# Patient Record
Sex: MALE | Race: WHITE | Employment: PART TIME | URBAN - METROPOLITAN AREA
[De-identification: names, ages, dates, MRNs, and addresses within clinical notes are randomized per-mention and may not be internally consistent; named-entity substitution may affect disease eponyms.]

---

## 2020-03-11 ENCOUNTER — OFFICE VISIT (OUTPATIENT)
Dept: PRIMARY CARE CLINIC | Age: 60
End: 2020-03-11
Payer: COMMERCIAL

## 2020-03-11 VITALS
WEIGHT: 180.4 LBS | HEIGHT: 69 IN | SYSTOLIC BLOOD PRESSURE: 136 MMHG | HEART RATE: 59 BPM | OXYGEN SATURATION: 99 % | BODY MASS INDEX: 26.72 KG/M2 | DIASTOLIC BLOOD PRESSURE: 78 MMHG

## 2020-03-11 DIAGNOSIS — Z13.1 SCREENING FOR DIABETES MELLITUS: ICD-10-CM

## 2020-03-11 DIAGNOSIS — Z00.00 ROUTINE PHYSICAL EXAMINATION: ICD-10-CM

## 2020-03-11 DIAGNOSIS — Z12.5 PROSTATE CANCER SCREENING: ICD-10-CM

## 2020-03-11 DIAGNOSIS — E55.9 VITAMIN D DEFICIENCY: ICD-10-CM

## 2020-03-11 DIAGNOSIS — Z13.220 LIPID SCREENING: ICD-10-CM

## 2020-03-11 LAB
A/G RATIO: 1.5 (ref 1.1–2.2)
ALBUMIN SERPL-MCNC: 4.6 G/DL (ref 3.4–5)
ALP BLD-CCNC: 57 U/L (ref 40–129)
ALT SERPL-CCNC: 21 U/L (ref 10–40)
ANION GAP SERPL CALCULATED.3IONS-SCNC: 9 MMOL/L (ref 3–16)
AST SERPL-CCNC: 15 U/L (ref 15–37)
BASOPHILS ABSOLUTE: 0.1 K/UL (ref 0–0.2)
BASOPHILS RELATIVE PERCENT: 0.7 %
BILIRUB SERPL-MCNC: 1.1 MG/DL (ref 0–1)
BUN BLDV-MCNC: 12 MG/DL (ref 7–20)
CALCIUM SERPL-MCNC: 10 MG/DL (ref 8.3–10.6)
CHLORIDE BLD-SCNC: 100 MMOL/L (ref 99–110)
CHOLESTEROL, TOTAL: 152 MG/DL (ref 0–199)
CO2: 27 MMOL/L (ref 21–32)
CREAT SERPL-MCNC: 1 MG/DL (ref 0.9–1.3)
EOSINOPHILS ABSOLUTE: 0.2 K/UL (ref 0–0.6)
EOSINOPHILS RELATIVE PERCENT: 1.5 %
GFR AFRICAN AMERICAN: >60
GFR NON-AFRICAN AMERICAN: >60
GLOBULIN: 3 G/DL
GLUCOSE BLD-MCNC: 109 MG/DL (ref 70–99)
HCT VFR BLD CALC: 48.2 % (ref 40.5–52.5)
HDLC SERPL-MCNC: 39 MG/DL (ref 40–60)
HEMOGLOBIN: 16 G/DL (ref 13.5–17.5)
LDL CHOLESTEROL CALCULATED: 97 MG/DL
LYMPHOCYTES ABSOLUTE: 3 K/UL (ref 1–5.1)
LYMPHOCYTES RELATIVE PERCENT: 24.8 %
MCH RBC QN AUTO: 29.7 PG (ref 26–34)
MCHC RBC AUTO-ENTMCNC: 33.2 G/DL (ref 31–36)
MCV RBC AUTO: 89.4 FL (ref 80–100)
MONOCYTES ABSOLUTE: 0.7 K/UL (ref 0–1.3)
MONOCYTES RELATIVE PERCENT: 6 %
NEUTROPHILS ABSOLUTE: 8 K/UL (ref 1.7–7.7)
NEUTROPHILS RELATIVE PERCENT: 67 %
PDW BLD-RTO: 14.5 % (ref 12.4–15.4)
PLATELET # BLD: 336 K/UL (ref 135–450)
PMV BLD AUTO: 9 FL (ref 5–10.5)
POTASSIUM SERPL-SCNC: 4.9 MMOL/L (ref 3.5–5.1)
PROSTATE SPECIFIC ANTIGEN: 1.21 NG/ML (ref 0–4)
RBC # BLD: 5.39 M/UL (ref 4.2–5.9)
SODIUM BLD-SCNC: 136 MMOL/L (ref 136–145)
TOTAL PROTEIN: 7.6 G/DL (ref 6.4–8.2)
TRIGL SERPL-MCNC: 80 MG/DL (ref 0–150)
TSH SERPL DL<=0.05 MIU/L-ACNC: 1.43 UIU/ML (ref 0.27–4.2)
VITAMIN D 25-HYDROXY: 32.4 NG/ML
VLDLC SERPL CALC-MCNC: 16 MG/DL
WBC # BLD: 12 K/UL (ref 4–11)

## 2020-03-11 PROCEDURE — 99386 PREV VISIT NEW AGE 40-64: CPT | Performed by: FAMILY MEDICINE

## 2020-03-11 SDOH — HEALTH STABILITY: MENTAL HEALTH: HOW OFTEN DO YOU HAVE A DRINK CONTAINING ALCOHOL?: MONTHLY OR LESS

## 2020-03-11 SDOH — ECONOMIC STABILITY: FOOD INSECURITY: WITHIN THE PAST 12 MONTHS, YOU WORRIED THAT YOUR FOOD WOULD RUN OUT BEFORE YOU GOT MONEY TO BUY MORE.: NEVER TRUE

## 2020-03-11 SDOH — ECONOMIC STABILITY: TRANSPORTATION INSECURITY
IN THE PAST 12 MONTHS, HAS THE LACK OF TRANSPORTATION KEPT YOU FROM MEDICAL APPOINTMENTS OR FROM GETTING MEDICATIONS?: NO

## 2020-03-11 SDOH — ECONOMIC STABILITY: TRANSPORTATION INSECURITY
IN THE PAST 12 MONTHS, HAS LACK OF TRANSPORTATION KEPT YOU FROM MEETINGS, WORK, OR FROM GETTING THINGS NEEDED FOR DAILY LIVING?: NO

## 2020-03-11 SDOH — ECONOMIC STABILITY: INCOME INSECURITY: HOW HARD IS IT FOR YOU TO PAY FOR THE VERY BASICS LIKE FOOD, HOUSING, MEDICAL CARE, AND HEATING?: NOT HARD AT ALL

## 2020-03-11 SDOH — ECONOMIC STABILITY: FOOD INSECURITY: WITHIN THE PAST 12 MONTHS, THE FOOD YOU BOUGHT JUST DIDN'T LAST AND YOU DIDN'T HAVE MONEY TO GET MORE.: NEVER TRUE

## 2020-03-11 ASSESSMENT — ENCOUNTER SYMPTOMS
RHINORRHEA: 0
EYES NEGATIVE: 1
NAUSEA: 0
EYE ITCHING: 0
ABDOMINAL DISTENTION: 0
CONSTIPATION: 0
VOICE CHANGE: 0
BLOOD IN STOOL: 0
PHOTOPHOBIA: 0
EYE REDNESS: 0
SORE THROAT: 0
VOMITING: 0
SINUS PAIN: 0
COLOR CHANGE: 0
TROUBLE SWALLOWING: 0
CHOKING: 0
WHEEZING: 0
SINUS PRESSURE: 0
BACK PAIN: 1
ABDOMINAL PAIN: 0
EYE PAIN: 0
ALLERGIC/IMMUNOLOGIC NEGATIVE: 1
SHORTNESS OF BREATH: 0
APNEA: 0
COUGH: 0
EYE DISCHARGE: 0
CHEST TIGHTNESS: 0
DIARRHEA: 0

## 2020-03-11 ASSESSMENT — PATIENT HEALTH QUESTIONNAIRE - PHQ9
SUM OF ALL RESPONSES TO PHQ9 QUESTIONS 1 & 2: 0
SUM OF ALL RESPONSES TO PHQ QUESTIONS 1-9: 0
2. FEELING DOWN, DEPRESSED OR HOPELESS: 0
SUM OF ALL RESPONSES TO PHQ QUESTIONS 1-9: 0
1. LITTLE INTEREST OR PLEASURE IN DOING THINGS: 0

## 2020-03-11 NOTE — PROGRESS NOTES
SUBJECTIVE:  Patient ID: Kelvin Morgan is a 64 y.o. male. Chief Complaint:  Chief Complaint   Patient presents with    Annual Exam    Established New Doctor       HPI   New visit  64year old male  Good Health  No Hx medication  No Hx Hospitalization or ER    History reviewed. No pertinent past medical history. History reviewed. No pertinent surgical history. No Known Allergies  Family History   Problem Relation Age of Onset   [facility] Diabetes Mother          age 65+    Other Father         age 80year old    [facility] Hypertension Father         Live in [address] [place] [address] History Narrative     x 26 year    Children 3 boys & 1 Girl    25,24,22,15    + tobacco    Self employed [address] x 5 years       There is no problem list on file for this patient. No current outpatient medications on file. No current facility-administered medications for this visit. No results found for: WBC, HGB, HCT, MCV, PLT  No results found for: CHOL  No results found for: TRIG  No results found for: HDL  No results found for: LDLCHOLESTEROL, LDLCALC  No results found for: LABVLDL, VLDL  No results found for: CHOLHDLRATIO    Chemistry    No results found for: NA, K, CL, CO2, BUN, CREATININE No results found for: CALCIUM, ALKPHOS, AST, ALT, BILITOT         Review of Systems   Constitutional: Negative for activity change, appetite change, chills, diaphoresis, fatigue, fever and unexpected weight change. Good health   HENT: Negative. Negative for congestion, ear discharge, ear pain, hearing loss, nosebleeds, postnasal drip, rhinorrhea, sinus pressure, sinus pain, sore throat, tinnitus, trouble swallowing and voice change. Eyes: Negative. Negative for photophobia, pain, discharge, redness, itching and visual disturbance. Respiratory: Negative for apnea, cough, choking, chest tightness, shortness of breath and wheezing.          Tobacco use   Cardiovascular: Negative for chest

## 2020-03-12 LAB
ESTIMATED AVERAGE GLUCOSE: 108.3 MG/DL
HBA1C MFR BLD: 5.4 %

## 2020-07-08 PROBLEM — Z72.0 TOBACCO USE: Status: ACTIVE | Noted: 2020-07-08

## 2020-07-28 ENCOUNTER — TELEPHONE (OUTPATIENT)
Dept: PRIMARY CARE CLINIC | Age: 60
End: 2020-07-28

## 2021-03-08 ENCOUNTER — OFFICE VISIT (OUTPATIENT)
Dept: PRIMARY CARE CLINIC | Age: 61
End: 2021-03-08
Payer: MEDICAID

## 2021-03-08 VITALS
HEIGHT: 69 IN | DIASTOLIC BLOOD PRESSURE: 95 MMHG | WEIGHT: 181.8 LBS | TEMPERATURE: 97.4 F | SYSTOLIC BLOOD PRESSURE: 145 MMHG | BODY MASS INDEX: 26.93 KG/M2 | HEART RATE: 66 BPM | OXYGEN SATURATION: 99 %

## 2021-03-08 DIAGNOSIS — Z13.220 LIPID SCREENING: ICD-10-CM

## 2021-03-08 DIAGNOSIS — K21.9 GASTROESOPHAGEAL REFLUX DISEASE WITHOUT ESOPHAGITIS: ICD-10-CM

## 2021-03-08 DIAGNOSIS — Z00.00 ROUTINE PHYSICAL EXAMINATION: ICD-10-CM

## 2021-03-08 DIAGNOSIS — Z13.1 DIABETES MELLITUS SCREENING: ICD-10-CM

## 2021-03-08 DIAGNOSIS — Z00.00 ROUTINE PHYSICAL EXAMINATION: Primary | ICD-10-CM

## 2021-03-08 DIAGNOSIS — Z72.0 TOBACCO USE: ICD-10-CM

## 2021-03-08 DIAGNOSIS — Z11.59 ENCOUNTER FOR HCV SCREENING TEST FOR LOW RISK PATIENT: ICD-10-CM

## 2021-03-08 DIAGNOSIS — D72.9 ABNORMAL WBC COUNT: Primary | ICD-10-CM

## 2021-03-08 LAB
A/G RATIO: 1.5 (ref 1.1–2.2)
ALBUMIN SERPL-MCNC: 4.5 G/DL (ref 3.4–5)
ALP BLD-CCNC: 58 U/L (ref 40–129)
ALT SERPL-CCNC: 21 U/L (ref 10–40)
ANION GAP SERPL CALCULATED.3IONS-SCNC: 9 MMOL/L (ref 3–16)
AST SERPL-CCNC: 16 U/L (ref 15–37)
BASOPHILS ABSOLUTE: 0.1 K/UL (ref 0–0.2)
BASOPHILS RELATIVE PERCENT: 0.8 %
BILIRUB SERPL-MCNC: 0.8 MG/DL (ref 0–1)
BUN BLDV-MCNC: 13 MG/DL (ref 7–20)
CALCIUM SERPL-MCNC: 9.6 MG/DL (ref 8.3–10.6)
CHLORIDE BLD-SCNC: 105 MMOL/L (ref 99–110)
CHOLESTEROL, TOTAL: 169 MG/DL (ref 0–199)
CO2: 23 MMOL/L (ref 21–32)
CREAT SERPL-MCNC: 1 MG/DL (ref 0.9–1.3)
EOSINOPHILS ABSOLUTE: 0.2 K/UL (ref 0–0.6)
EOSINOPHILS RELATIVE PERCENT: 1.5 %
ESTIMATED AVERAGE GLUCOSE: 111.2 MG/DL
GFR AFRICAN AMERICAN: >60
GFR NON-AFRICAN AMERICAN: >60
GLOBULIN: 3 G/DL
GLUCOSE BLD-MCNC: 103 MG/DL (ref 70–99)
HBA1C MFR BLD: 5.5 %
HCT VFR BLD CALC: 43.9 % (ref 40.5–52.5)
HDLC SERPL-MCNC: 38 MG/DL (ref 40–60)
HEMOGLOBIN: 14.7 G/DL (ref 13.5–17.5)
HEPATITIS C ANTIBODY INTERPRETATION: NORMAL
LDL CHOLESTEROL CALCULATED: 115 MG/DL
LYMPHOCYTES ABSOLUTE: 3.4 K/UL (ref 1–5.1)
LYMPHOCYTES RELATIVE PERCENT: 29.3 %
MCH RBC QN AUTO: 28.9 PG (ref 26–34)
MCHC RBC AUTO-ENTMCNC: 33.4 G/DL (ref 31–36)
MCV RBC AUTO: 86.6 FL (ref 80–100)
MONOCYTES ABSOLUTE: 0.7 K/UL (ref 0–1.3)
MONOCYTES RELATIVE PERCENT: 6.1 %
NEUTROPHILS ABSOLUTE: 7.3 K/UL (ref 1.7–7.7)
NEUTROPHILS RELATIVE PERCENT: 62.3 %
PDW BLD-RTO: 15.2 % (ref 12.4–15.4)
PLATELET # BLD: 294 K/UL (ref 135–450)
PMV BLD AUTO: 8.8 FL (ref 5–10.5)
POTASSIUM SERPL-SCNC: 4.7 MMOL/L (ref 3.5–5.1)
PROSTATE SPECIFIC ANTIGEN: 1.07 NG/ML (ref 0–4)
RBC # BLD: 5.08 M/UL (ref 4.2–5.9)
SODIUM BLD-SCNC: 137 MMOL/L (ref 136–145)
TOTAL PROTEIN: 7.5 G/DL (ref 6.4–8.2)
TRIGL SERPL-MCNC: 81 MG/DL (ref 0–150)
TSH SERPL DL<=0.05 MIU/L-ACNC: 1.47 UIU/ML (ref 0.27–4.2)
VLDLC SERPL CALC-MCNC: 16 MG/DL
WBC # BLD: 11.7 K/UL (ref 4–11)

## 2021-03-08 PROCEDURE — G8484 FLU IMMUNIZE NO ADMIN: HCPCS | Performed by: FAMILY MEDICINE

## 2021-03-08 PROCEDURE — 99396 PREV VISIT EST AGE 40-64: CPT | Performed by: FAMILY MEDICINE

## 2021-03-08 ASSESSMENT — ENCOUNTER SYMPTOMS
EYE PAIN: 0
SINUS PAIN: 0
BLOOD IN STOOL: 0
COLOR CHANGE: 0
ABDOMINAL DISTENTION: 0
APNEA: 0
CONSTIPATION: 0
ABDOMINAL PAIN: 0
CHOKING: 0
EYE ITCHING: 0
TROUBLE SWALLOWING: 0
VOMITING: 0
WHEEZING: 0
BACK PAIN: 1
DIARRHEA: 0
RHINORRHEA: 0
COUGH: 0
RESPIRATORY NEGATIVE: 1
CHEST TIGHTNESS: 0
SORE THROAT: 0
EYE REDNESS: 0
EYES NEGATIVE: 1
VOICE CHANGE: 0
PHOTOPHOBIA: 0
NAUSEA: 0
SHORTNESS OF BREATH: 0
SINUS PRESSURE: 0
EYE DISCHARGE: 0
ALLERGIC/IMMUNOLOGIC NEGATIVE: 1

## 2021-03-08 ASSESSMENT — PATIENT HEALTH QUESTIONNAIRE - PHQ9
SUM OF ALL RESPONSES TO PHQ QUESTIONS 1-9: 0
2. FEELING DOWN, DEPRESSED OR HOPELESS: 0
1. LITTLE INTEREST OR PLEASURE IN DOING THINGS: 0
SUM OF ALL RESPONSES TO PHQ QUESTIONS 1-9: 0

## 2021-03-08 NOTE — PROGRESS NOTES
SUBJECTIVE:  Patient ID: Mariya Guo is a 63 y.o. male. Chief Complaint:  Chief Complaint   Patient presents with    Annual Exam       HPI   63year old Male  Annual  +Tobacco  +Acid Reflux once a while depends on food  OTC Tums prn  History reviewed. No pertinent past medical history. No past surgical history on file. No Known Allergies  Family History   Problem Relation Age of Onset   [facility] Diabetes Mother          age 65+    Other Father         age 80year old    [facility] Hypertension Father         Live in [address] [place] [address] History Narrative     x 26 year    Children 3 boys & 1 Girl    25,24,22,15    + tobacco    Self employed [address] x 5 years       Patient Active Problem List   Diagnosis    Tobacco use     No current outpatient medications on file. No current facility-administered medications for this visit.       Lab Results   Component Value Date    WBC 12.0 (H) 2020    HGB 16.0 2020    HCT 48.2 2020    MCV 89.4 2020     2020     Lab Results   Component Value Date    CHOL 152 2020     Lab Results   Component Value Date    TRIG 80 2020     Lab Results   Component Value Date    HDL 39 (L) 2020     Lab Results   Component Value Date    LDLCALC 97 2020     Lab Results   Component Value Date    LABVLDL 16 2020     No results found for: [facility]    Chemistry        Component Value Date/Time     2020 1129    K 4.9 2020 1129     2020 1129    CO2 27 2020 1129    BUN 12 2020 1129    CREATININE 1.0 2020 1129        Component Value Date/Time    CALCIUM 10.0 2020 1129    ALKPHOS 57 2020 1129    AST 15 2020 1129    ALT 21 2020 1129    BILITOT 1.1 (H) 2020 1129        Lab Results   Component Value Date    PSA 1.21 2020     Lab Results   Component Value Date    TSH 1.43 2020     Lab Results   Component Value Date    LABA1C 5.4 03/11/2020     Lab Results   Component Value Date    .3 03/11/2020         Review of Systems   Constitutional: Negative for activity change, appetite change, chills, diaphoresis, fatigue, fever and unexpected weight change. Good Health  Walking  Flipping houses   HENT: Negative. Negative for congestion, ear discharge, ear pain, hearing loss, nosebleeds, postnasal drip, rhinorrhea, sinus pressure, sinus pain, sore throat, tinnitus, trouble swallowing and voice change. Eyes: Negative. Negative for photophobia, pain, discharge, redness, itching and visual disturbance. Respiratory: Negative. Negative for apnea, cough, choking, chest tightness, shortness of breath and wheezing.         +Tobacco   Cardiovascular: Negative. Negative for chest pain, palpitations and leg swelling. Gastrointestinal: Negative for abdominal distention, abdominal pain, blood in stool, constipation, diarrhea, nausea and vomiting. BM good  Reflux once a while mainly with Fast food  [place] guard up to date   Endocrine: Negative. Negative for cold intolerance, heat intolerance, polydipsia, polyphagia and polyuria. Genitourinary: Negative for dysuria, flank pain, frequency and urgency. Wake up once a while to urinate depends on [employer] intake   Musculoskeletal: Positive for back pain. Negative for gait problem and neck pain. Back when he sit long time  But not with walking  Lt hand 4 th finger   Skin: Negative for color change and rash. Allergic/Immunologic: Negative. Negative for environmental allergies and food allergies. Neurological: Negative for dizziness, tremors, light-headedness, numbness and headaches. Psychiatric/Behavioral: Negative for agitation, behavioral problems, decreased concentration, self-injury and sleep disturbance. The patient is not hyperactive.          Sleep 6 H  Wife  Children  Son 25 +   Other children 22,21,16       OBJECTIVE:  BP (!) 145/95   Pulse 66   Temp 97.4 °F (36.3 °C) (Infrared)   Ht 5' 8.9\" (1.75 m)   Wt 181 lb 12.8 oz (82.5 kg)   SpO2 99%   BMI 26.93 kg/m²   Physical Exam  Vitals signs reviewed. Constitutional:       Appearance: He is well-developed. Comments: Aware of BP reading  He has home BP cuff   His son in medical field check his BP  BP @Home is much lower around 120-125/80-85   HENT:      Head: Normocephalic. Right Ear: External ear normal.      Left Ear: External ear normal.      Nose: Nose normal.      Mouth/Throat:      Pharynx: No oropharyngeal exudate. Eyes:      Conjunctiva/sclera: Conjunctivae normal.      Pupils: Pupils are equal, round, and reactive to light. Neck:      Musculoskeletal: Normal range of motion and neck supple. Cardiovascular:      Rate and Rhythm: Normal rate and regular rhythm. Heart sounds: Normal heart sounds. No murmur. Pulmonary:      Effort: Pulmonary effort is normal.      Breath sounds: Normal breath sounds. Abdominal:      General: Bowel sounds are normal.      Palpations: Abdomen is soft. Musculoskeletal: Normal range of motion. Skin:     General: Skin is warm. Neurological:      Mental Status: He is alert and oriented to person, place, and time. Deep Tendon Reflexes: Reflexes are normal and symmetric. Psychiatric:         Behavior: Behavior normal.         Thought Content: Thought content normal.         Judgment: Judgment normal.         ASSESSMENT/PLAN:      Diagnosis Orders   1. Routine physical examination  CBC Auto Differential    Comprehensive Metabolic Panel    Hemoglobin A1C    Lipid Panel    PSA screening    TSH without Reflex   2. Tobacco use  CT Lung Screen (Initial or Annual)   3. Lipid screening  Lipid Panel   4. Diabetes mellitus screening  Hemoglobin A1C   5.  Encounter for HCV screening test for low risk patient  HEPATITIS C ANTIBODY   6. Gastroesophageal reflux disease without esophagitis         D/C Tobacco  Aware of risk & complications of Tobacco use  Offer Tdap He likes think about it  He will check BP @Home usually it runs lower   Call with BP reading or send chart message

## 2021-03-15 PROBLEM — K80.20 CALCULUS OF GALLBLADDER WITHOUT CHOLECYSTITIS WITHOUT OBSTRUCTION: Status: ACTIVE | Noted: 2021-03-15

## 2021-03-15 PROBLEM — K76.0 STEATOSIS OF LIVER: Status: ACTIVE | Noted: 2021-03-15

## 2021-06-09 ENCOUNTER — E-VISIT (OUTPATIENT)
Dept: INTERNAL MEDICINE CLINIC | Age: 61
End: 2021-06-09
Payer: MEDICAID

## 2021-06-09 DIAGNOSIS — H00.034 CELLULITIS OF LEFT UPPER EYELID: Primary | ICD-10-CM

## 2021-06-09 PROCEDURE — 99421 OL DIG E/M SVC 5-10 MIN: CPT | Performed by: INTERNAL MEDICINE

## 2021-06-09 RX ORDER — CEPHALEXIN 500 MG/1
500 CAPSULE ORAL 3 TIMES DAILY
Qty: 21 CAPSULE | Refills: 0 | Status: SHIPPED | OUTPATIENT
Start: 2021-06-09 | End: 2021-06-16

## 2021-06-09 NOTE — PROGRESS NOTES
5-10 minutes were spent on the digital evaluation and management of this patient. Diagnoses and associated orders for this visit:     Cellulitis of left upper eyelid  cephALEXin (KEFLEX) 500 MG capsule; Take 1 capsule by mouth 3 times daily for 7 days    I have sent your medication to the pharmacy on file. If not better, please schedule for an in person or a virtual video visit instead of an Evisit so you can be examined and interact with your provider to better diagnose and treat your symptoms.

## 2022-01-05 ENCOUNTER — OFFICE VISIT (OUTPATIENT)
Dept: PRIMARY CARE CLINIC | Age: 62
End: 2022-01-05
Payer: MEDICAID

## 2022-01-05 VITALS
TEMPERATURE: 97.1 F | BODY MASS INDEX: 27.19 KG/M2 | WEIGHT: 183.6 LBS | HEART RATE: 63 BPM | OXYGEN SATURATION: 98 % | HEIGHT: 69 IN | DIASTOLIC BLOOD PRESSURE: 88 MMHG | SYSTOLIC BLOOD PRESSURE: 130 MMHG

## 2022-01-05 DIAGNOSIS — Z13.1 DIABETES MELLITUS SCREENING: ICD-10-CM

## 2022-01-05 DIAGNOSIS — K76.0 FATTY LIVER: ICD-10-CM

## 2022-01-05 DIAGNOSIS — Z72.0 TOBACCO USE: ICD-10-CM

## 2022-01-05 DIAGNOSIS — Z13.220 LIPID SCREENING: ICD-10-CM

## 2022-01-05 DIAGNOSIS — Z12.5 PROSTATE CANCER SCREENING: ICD-10-CM

## 2022-01-05 DIAGNOSIS — Z00.00 ROUTINE PHYSICAL EXAMINATION: Primary | ICD-10-CM

## 2022-01-05 DIAGNOSIS — K80.20 CALCULUS OF GALLBLADDER WITHOUT CHOLECYSTITIS WITHOUT OBSTRUCTION: ICD-10-CM

## 2022-01-05 DIAGNOSIS — R06.83 SNORES: ICD-10-CM

## 2022-01-05 PROCEDURE — 99396 PREV VISIT EST AGE 40-64: CPT | Performed by: FAMILY MEDICINE

## 2022-01-05 PROCEDURE — G8484 FLU IMMUNIZE NO ADMIN: HCPCS | Performed by: FAMILY MEDICINE

## 2022-01-05 SDOH — HEALTH STABILITY: PHYSICAL HEALTH: ON AVERAGE, HOW MANY MINUTES DO YOU ENGAGE IN EXERCISE AT THIS LEVEL?: 30 MIN

## 2022-01-05 SDOH — ECONOMIC STABILITY: FOOD INSECURITY: WITHIN THE PAST 12 MONTHS, YOU WORRIED THAT YOUR FOOD WOULD RUN OUT BEFORE YOU GOT MONEY TO BUY MORE.: NEVER TRUE

## 2022-01-05 SDOH — ECONOMIC STABILITY: HOUSING INSECURITY
IN THE LAST 12 MONTHS, WAS THERE A TIME WHEN YOU DID NOT HAVE A STEADY PLACE TO SLEEP OR SLEPT IN A SHELTER (INCLUDING NOW)?: NO

## 2022-01-05 SDOH — ECONOMIC STABILITY: FOOD INSECURITY: WITHIN THE PAST 12 MONTHS, THE FOOD YOU BOUGHT JUST DIDN'T LAST AND YOU DIDN'T HAVE MONEY TO GET MORE.: NEVER TRUE

## 2022-01-05 SDOH — ECONOMIC STABILITY: INCOME INSECURITY: IN THE LAST 12 MONTHS, WAS THERE A TIME WHEN YOU WERE NOT ABLE TO PAY THE MORTGAGE OR RENT ON TIME?: NO

## 2022-01-05 SDOH — HEALTH STABILITY: PHYSICAL HEALTH: ON AVERAGE, HOW MANY DAYS PER WEEK DO YOU ENGAGE IN MODERATE TO STRENUOUS EXERCISE (LIKE A BRISK WALK)?: 2 DAYS

## 2022-01-05 SDOH — ECONOMIC STABILITY: HOUSING INSECURITY: IN THE LAST 12 MONTHS, HOW MANY PLACES HAVE YOU LIVED?: 1

## 2022-01-05 ASSESSMENT — ENCOUNTER SYMPTOMS
ABDOMINAL DISTENTION: 0
BACK PAIN: 0
EYES NEGATIVE: 1
VOMITING: 0
COLOR CHANGE: 0
BLOOD IN STOOL: 0
DIARRHEA: 0
NAUSEA: 0
VOICE CHANGE: 0
PHOTOPHOBIA: 0
SINUS PAIN: 0
EYE ITCHING: 0
SORE THROAT: 0
APNEA: 1
RHINORRHEA: 0
EYE DISCHARGE: 0
CHEST TIGHTNESS: 0
EYE REDNESS: 0
ABDOMINAL PAIN: 0
CHOKING: 0
EYE PAIN: 0
CONSTIPATION: 0
TROUBLE SWALLOWING: 0
SHORTNESS OF BREATH: 0
COUGH: 0
WHEEZING: 0
ALLERGIC/IMMUNOLOGIC NEGATIVE: 1
SINUS PRESSURE: 0

## 2022-01-05 ASSESSMENT — LIFESTYLE VARIABLES: HOW OFTEN DO YOU HAVE A DRINK CONTAINING ALCOHOL: NEVER

## 2022-01-05 ASSESSMENT — SOCIAL DETERMINANTS OF HEALTH (SDOH)
HOW OFTEN DO YOU ATTEND CHURCH OR RELIGIOUS SERVICES?: NEVER
HOW OFTEN DO YOU ATTENT MEETINGS OF THE CLUB OR ORGANIZATION YOU BELONG TO?: NEVER
HOW OFTEN DO YOU GET TOGETHER WITH FRIENDS OR RELATIVES?: MORE THAN THREE TIMES A WEEK
DO YOU BELONG TO ANY CLUBS OR ORGANIZATIONS SUCH AS CHURCH GROUPS UNIONS, FRATERNAL OR ATHLETIC GROUPS, OR SCHOOL GROUPS?: NO
IN A TYPICAL WEEK, HOW MANY TIMES DO YOU TALK ON THE PHONE WITH FAMILY, FRIENDS, OR NEIGHBORS?: MORE THAN THREE TIMES A WEEK
HOW HARD IS IT FOR YOU TO PAY FOR THE VERY BASICS LIKE FOOD, HOUSING, MEDICAL CARE, AND HEATING?: NOT HARD AT ALL

## 2022-01-05 NOTE — PROGRESS NOTES
SUBJECTIVE:  Patient ID: Cosmo Aparicio is a 63 y.o. male. Chief Complaint:  Chief Complaint   Patient presents with   Hernandez Pink       HPI   63year old Male  Annual    History reviewed. No pertinent past medical history. History reviewed. No pertinent surgical history. 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 No current facility-administered medications for this visit.      Lab Results   Component Value Date    WBC 11.7 (H) 2021    HGB 14.7 2021    HCT 43.9 2021    MCV 86.6 2021     2021     Lab Results   Component Value Date    CHOL 169 2021    CHOL 152 2020     Lab Results   Component Value Date    TRIG 81 2021    TRIG 80 2020     Lab Results   Component Value Date    HDL 38 (L) 2021    HDL 39 (L) 2020     Lab Results   Component Value Date    LDLCALC 115 (H) 2021    LDLCALC 97 2020     Lab Results   Component Value Date    LABVLDL 16 2021    LABVLDL 16 2020     No results found for: [facility]    Chemistry        Component Value Date/Time     2021 1047    K 4.7 2021 1047     2021 1047    CO2 23 2021 1047    BUN 13 2021 1047    CREATININE 1.0 2021 1047        Component Value Date/Time    CALCIUM 9.6 2021 1047    ALKPHOS 58 03/08/2021 1047    AST 16 03/08/2021 1047    ALT 21 03/08/2021 1047    BILITOT 0.8 03/08/2021 1047        Lab Results   Component Value Date    LABA1C 5.5 03/08/2021     Lab Results   Component Value Date    .2 03/08/2021     Lab Results   Component Value Date    TSH 1.47 03/08/2021     Lab Results   Component Value Date    PSA 1.07 03/08/2021    PSA 1.21 03/11/2020         Review of Systems   Constitutional: Negative for activity change, appetite change, chills, diaphoresis, fatigue, fever and unexpected weight change. Over all good health   HENT: Negative for congestion, ear discharge, ear pain, hearing loss, nosebleeds, postnasal drip, rhinorrhea, sinus pressure, sinus pain, sore throat, tinnitus, trouble swallowing and voice change. Hx Big tonsils   Eyes: Negative. Negative for photophobia, pain, discharge, redness, itching and visual disturbance. Respiratory: Positive for apnea. Negative for cough, choking, chest tightness, shortness of breath and wheezing. Snore  Sleep comfortable  +Tobacco   CT Lung sceen  done 3/2021   Cardiovascular: Negative. Negative for chest pain, palpitations and leg swelling. Gastrointestinal: Negative for abdominal distention, abdominal pain, blood in stool, constipation, diarrhea, nausea and vomiting. BM good regular  No Colonoscopy yet  [place] guard 7/2020  +Fatty Liver incidental on CT scan  +Gall stone incidental on CT scab   Endocrine: Negative. Negative for cold intolerance, heat intolerance, polydipsia, polyphagia and polyuria. Genitourinary: Negative. Negative for dysuria, flank pain and frequency. Musculoskeletal: Negative. Negative for back pain, gait problem and neck pain. Back pain with heavy work  Or sit for long time   Once he walk no pain  4 th finger Lt ? trigger   Skin: Negative. Negative for color change and rash. Allergic/Immunologic: Negative. Negative for environmental allergies and food allergies. Neurological: Negative. Negative for dizziness, tremors, light-headedness, numbness and headaches. Headache when change season between summer & winter  Use OTC Advil with good result   Hematological: Negative. Psychiatric/Behavioral: Negative. Negative for agitation, behavioral problems, decreased concentration, self-injury, sleep disturbance and suicidal ideas. The patient is not nervous/anxious and is not hyperactive. Feel rested       OBJECTIVE:  /88 (Site: Left Upper Arm, Position: Sitting, Cuff Size: Medium Adult)   Pulse 63   Temp 97.1 °F (36.2 °C) (Infrared)   Ht 5' 8.9\" (1.75 m)   Wt 183 lb 9.6 oz (83.3 kg)   SpO2 98%   BMI 27.19 kg/m²   Physical Exam  Vitals reviewed. Constitutional:       Appearance: He is well-developed. HENT:      Head: Normocephalic. Right Ear: External ear normal.      Left Ear: External ear normal.      Nose: Nose normal.      Mouth/Throat:      Pharynx: No oropharyngeal exudate. Eyes:      Conjunctiva/sclera: Conjunctivae normal.      Pupils: Pupils are equal, round, and reactive to light. Cardiovascular:      Rate and Rhythm: Normal rate and regular rhythm. Heart sounds: Normal heart sounds. No murmur heard. Pulmonary:      Effort: Pulmonary effort is normal.      Breath sounds: Normal breath sounds. Abdominal:      General: Bowel sounds are normal.      Palpations: Abdomen is soft. Musculoskeletal:         General: Normal range of motion. Cervical back: Normal range of motion and neck supple. Skin:     General: Skin is warm. Neurological:      Mental Status: He is alert and oriented to person, place, and time. Deep Tendon Reflexes: Reflexes are normal and symmetric. Psychiatric:         Behavior: Behavior normal.         Thought Content: Thought content normal.         Judgment: Judgment normal.         ASSESSMENT/PLAN:      Diagnosis Orders   1.  Routine physical examination  CBC Auto Differential Comprehensive Metabolic Panel    Hemoglobin A1C    Lipid Panel    PSA screening   2. Diabetes mellitus screening  Hemoglobin A1C   3. Lipid screening  Lipid Panel   4. Prostate cancer screening  PSA screening   5. Tobacco use     6. Snores     7. Calculus of gallbladder without cholecystitis without obstruction     8.  Fatty liver         Covid x one J&J Booster 1/7/22  No Flu shot declined  Decline sleep apnea referral  CT lung done 3/14/2021  [place] guard done 5/13/49LQOOD of risk & complications of tobacco use   Encourage D/C Tobacco

## 2022-04-06 DIAGNOSIS — Z12.5 PROSTATE CANCER SCREENING: ICD-10-CM

## 2022-04-06 DIAGNOSIS — Z13.220 LIPID SCREENING: ICD-10-CM

## 2022-04-06 DIAGNOSIS — Z00.00 ROUTINE PHYSICAL EXAMINATION: ICD-10-CM

## 2022-04-06 DIAGNOSIS — Z13.1 DIABETES MELLITUS SCREENING: ICD-10-CM

## 2022-04-06 LAB
A/G RATIO: 1.6 (ref 1.1–2.2)
ALBUMIN SERPL-MCNC: 4.7 G/DL (ref 3.4–5)
ALP BLD-CCNC: 57 U/L (ref 40–129)
ALT SERPL-CCNC: 28 U/L (ref 10–40)
ANION GAP SERPL CALCULATED.3IONS-SCNC: 16 MMOL/L (ref 3–16)
AST SERPL-CCNC: 19 U/L (ref 15–37)
BASOPHILS ABSOLUTE: 0.1 K/UL (ref 0–0.2)
BASOPHILS RELATIVE PERCENT: 0.8 %
BILIRUB SERPL-MCNC: 0.8 MG/DL (ref 0–1)
BUN BLDV-MCNC: 14 MG/DL (ref 7–20)
CALCIUM SERPL-MCNC: 9.9 MG/DL (ref 8.3–10.6)
CHLORIDE BLD-SCNC: 107 MMOL/L (ref 99–110)
CHOLESTEROL, TOTAL: 160 MG/DL (ref 0–199)
CO2: 19 MMOL/L (ref 21–32)
CREAT SERPL-MCNC: 1 MG/DL (ref 0.8–1.3)
EOSINOPHILS ABSOLUTE: 0.2 K/UL (ref 0–0.6)
EOSINOPHILS RELATIVE PERCENT: 1.9 %
GFR AFRICAN AMERICAN: >60
GFR NON-AFRICAN AMERICAN: >60
GLUCOSE BLD-MCNC: 101 MG/DL (ref 70–99)
HCT VFR BLD CALC: 47.5 % (ref 40.5–52.5)
HDLC SERPL-MCNC: 38 MG/DL (ref 40–60)
HEMOGLOBIN: 15.6 G/DL (ref 13.5–17.5)
LDL CHOLESTEROL CALCULATED: 106 MG/DL
LYMPHOCYTES ABSOLUTE: 3.4 K/UL (ref 1–5.1)
LYMPHOCYTES RELATIVE PERCENT: 30.6 %
MCH RBC QN AUTO: 28.8 PG (ref 26–34)
MCHC RBC AUTO-ENTMCNC: 32.9 G/DL (ref 31–36)
MCV RBC AUTO: 87.7 FL (ref 80–100)
MONOCYTES ABSOLUTE: 0.7 K/UL (ref 0–1.3)
MONOCYTES RELATIVE PERCENT: 6.1 %
NEUTROPHILS ABSOLUTE: 6.8 K/UL (ref 1.7–7.7)
NEUTROPHILS RELATIVE PERCENT: 60.6 %
PDW BLD-RTO: 14.9 % (ref 12.4–15.4)
PLATELET # BLD: 311 K/UL (ref 135–450)
PMV BLD AUTO: 8.5 FL (ref 5–10.5)
POTASSIUM SERPL-SCNC: 4.9 MMOL/L (ref 3.5–5.1)
PROSTATE SPECIFIC ANTIGEN: 0.97 NG/ML (ref 0–4)
RBC # BLD: 5.41 M/UL (ref 4.2–5.9)
SODIUM BLD-SCNC: 142 MMOL/L (ref 136–145)
TOTAL PROTEIN: 7.7 G/DL (ref 6.4–8.2)
TRIGL SERPL-MCNC: 78 MG/DL (ref 0–150)
VLDLC SERPL CALC-MCNC: 16 MG/DL
WBC # BLD: 11.3 K/UL (ref 4–11)

## 2022-04-07 LAB
ESTIMATED AVERAGE GLUCOSE: 114 MG/DL
HBA1C MFR BLD: 5.6 %

## 2022-05-18 ENCOUNTER — OFFICE VISIT (OUTPATIENT)
Dept: PRIMARY CARE CLINIC | Age: 62
End: 2022-05-18
Payer: MEDICAID

## 2022-05-18 VITALS
SYSTOLIC BLOOD PRESSURE: 138 MMHG | HEIGHT: 69 IN | OXYGEN SATURATION: 99 % | BODY MASS INDEX: 26.19 KG/M2 | HEART RATE: 63 BPM | DIASTOLIC BLOOD PRESSURE: 92 MMHG | TEMPERATURE: 98.2 F | WEIGHT: 176.8 LBS

## 2022-05-18 DIAGNOSIS — D72.810 LYMPHOCYTOPENIA: ICD-10-CM

## 2022-05-18 DIAGNOSIS — H53.8 BLURRED VISION: Primary | ICD-10-CM

## 2022-05-18 LAB
BASOPHILS ABSOLUTE: 0.1 K/UL (ref 0–0.2)
BASOPHILS RELATIVE PERCENT: 0.9 %
EOSINOPHILS ABSOLUTE: 0.3 K/UL (ref 0–0.6)
EOSINOPHILS RELATIVE PERCENT: 2.9 %
HCT VFR BLD CALC: 47.1 % (ref 40.5–52.5)
HEMOGLOBIN: 15.7 G/DL (ref 13.5–17.5)
LYMPHOCYTES ABSOLUTE: 3 K/UL (ref 1–5.1)
LYMPHOCYTES RELATIVE PERCENT: 26.3 %
MCH RBC QN AUTO: 28.9 PG (ref 26–34)
MCHC RBC AUTO-ENTMCNC: 33.3 G/DL (ref 31–36)
MCV RBC AUTO: 86.7 FL (ref 80–100)
MONOCYTES ABSOLUTE: 0.7 K/UL (ref 0–1.3)
MONOCYTES RELATIVE PERCENT: 6.4 %
NEUTROPHILS ABSOLUTE: 7.3 K/UL (ref 1.7–7.7)
NEUTROPHILS RELATIVE PERCENT: 63.5 %
PDW BLD-RTO: 15.1 % (ref 12.4–15.4)
PLATELET # BLD: 341 K/UL (ref 135–450)
PMV BLD AUTO: 8.3 FL (ref 5–10.5)
RBC # BLD: 5.44 M/UL (ref 4.2–5.9)
WBC # BLD: 11.5 K/UL (ref 4–11)

## 2022-05-18 PROCEDURE — G8427 DOCREV CUR MEDS BY ELIG CLIN: HCPCS | Performed by: FAMILY MEDICINE

## 2022-05-18 PROCEDURE — G8419 CALC BMI OUT NRM PARAM NOF/U: HCPCS | Performed by: FAMILY MEDICINE

## 2022-05-18 PROCEDURE — 99213 OFFICE O/P EST LOW 20 MIN: CPT | Performed by: FAMILY MEDICINE

## 2022-05-18 PROCEDURE — 3017F COLORECTAL CA SCREEN DOC REV: CPT | Performed by: FAMILY MEDICINE

## 2022-05-18 PROCEDURE — 4004F PT TOBACCO SCREEN RCVD TLK: CPT | Performed by: FAMILY MEDICINE

## 2022-05-18 RX ORDER — POLYMYXIN B SULFATE AND TRIMETHOPRIM 1; 10000 MG/ML; [USP'U]/ML
1 SOLUTION OPHTHALMIC EVERY 4 HOURS
Qty: 1 EACH | Refills: 0 | Status: SHIPPED | OUTPATIENT
Start: 2022-05-18 | End: 2022-05-28

## 2022-05-18 ASSESSMENT — PATIENT HEALTH QUESTIONNAIRE - PHQ9
SUM OF ALL RESPONSES TO PHQ QUESTIONS 1-9: 0
SUM OF ALL RESPONSES TO PHQ QUESTIONS 1-9: 0
1. LITTLE INTEREST OR PLEASURE IN DOING THINGS: 0
SUM OF ALL RESPONSES TO PHQ QUESTIONS 1-9: 0
SUM OF ALL RESPONSES TO PHQ9 QUESTIONS 1 & 2: 0
SUM OF ALL RESPONSES TO PHQ QUESTIONS 1-9: 0
2. FEELING DOWN, DEPRESSED OR HOPELESS: 0

## 2022-05-18 ASSESSMENT — ENCOUNTER SYMPTOMS
EYE REDNESS: 1
EYE PAIN: 1
ABDOMINAL PAIN: 0
EYE ITCHING: 1
EYE DISCHARGE: 1

## 2022-05-18 NOTE — PROGRESS NOTES
SUBJECTIVE:  Patient ID: Denise Rivers is a 63 y.o. male. Chief Complaint:  Chief Complaint   Patient presents with    Conjunctivitis     left eye x 5 days       HPI   63year old Male  Hx Allergy  It does affect his eye + redness & mild swelling  OTC eye drop     History reviewed. No pertinent past medical history. No past surgical history on file. 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 No current facility-administered medications for this visit.      Lab Results   Component Value Date    WBC 11.3 (H) 2022    HGB 15.6 2022    HCT 47.5 2022    MCV 87.7 2022     2022     Lab Results   Component Value Date    CHOL 160 2022    CHOL 169 2021    CHOL 152 2020     Lab Results   Component Value Date    TRIG 78 2022    TRIG 81 2021    TRIG 80 2020     Lab Results   Component Value Date    HDL 38 (L) 2022    HDL 38 (L) 2021    HDL 39 (L) 2020     Lab Results   Component Value Date    LDLCALC 106 (H) 2022    LDLCALC 115 (H) 2021    LDLCALC 97 2020     Lab Results   Component Value Date    LABVLDL 16 2022    LABVLDL 16 2021    LABVLDL 16 2020     No results found for: [facility]    Chemistry        Component Value Date/Time     04/06/2022 1141    K 4.9 04/06/2022 1141     04/06/2022 1141    CO2 19 (L) 04/06/2022 1141    BUN 14 04/06/2022 1141    CREATININE 1.0 04/06/2022 1141        Component Value Date/Time    CALCIUM 9.9 04/06/2022 1141    ALKPHOS 57 04/06/2022 1141    AST 19 04/06/2022 1141    ALT 28 04/06/2022 1141    BILITOT 0.8 04/06/2022 1141            Review of Systems   Constitutional: Negative for chills and fever. HENT: Negative for congestion. Eyes: Positive for pain, discharge, redness and itching. Respiratory:        Tobacco use   Cardiovascular: Negative for chest pain, palpitations and leg swelling. Gastrointestinal: Negative for abdominal pain. Allergic/Immunologic: Positive for environmental allergies. OBJECTIVE:  BP (!) 138/92 (Site: Left Upper Arm, Position: Sitting, Cuff Size: Medium Adult)   Pulse 63   Temp 98.2 °F (36.8 °C) (Infrared)   Ht 5' 8.9\" (1.75 m)   Wt 176 lb 12.8 oz (80.2 kg)   SpO2 99%   BMI 26.18 kg/m²   Physical Exam  Constitutional:       Comments: awre BP reading   Eyes:      General:         Left eye: Discharge present. Extraocular Movements: Extraocular movements intact. Pupils: Pupils are equal, round, and reactive to light. Neurological:      General: No focal deficit present. Mental Status: He is alert and oriented to person, place, and time. ASSESSMENT/PLAN:      Diagnosis Orders   1.  Blurred vision  Tammi Nielsen MD, (Anterior Segment Reconstruction, Comprehensive and Surgical Ophthalmology) Ophthalmology, [facility]   2. Lymphocytopenia  CBC with Auto Differential       Patient is leaving to [facility] tomorrow for one month  High BP reading Pt is aware of BP is up & down variation No History of Rx   Pt will monitor BP & get visit when he comes from [facility]

## 2022-07-22 DIAGNOSIS — D72.820 LYMPHOCYTOSIS: Primary | ICD-10-CM

## 2022-09-02 DIAGNOSIS — D72.829 LEUKOCYTOSIS, UNSPECIFIED TYPE: Primary | ICD-10-CM

## 2022-09-14 DIAGNOSIS — D72.829 LEUKOCYTOSIS, UNSPECIFIED TYPE: ICD-10-CM

## 2022-09-14 LAB
BASOPHILS ABSOLUTE: 0.1 K/UL (ref 0–0.2)
BASOPHILS RELATIVE PERCENT: 0.7 %
EOSINOPHILS ABSOLUTE: 0.2 K/UL (ref 0–0.6)
EOSINOPHILS RELATIVE PERCENT: 2 %
HCT VFR BLD CALC: 44.1 % (ref 40.5–52.5)
HEMOGLOBIN: 14.7 G/DL (ref 13.5–17.5)
LYMPHOCYTES ABSOLUTE: 2.8 K/UL (ref 1–5.1)
LYMPHOCYTES RELATIVE PERCENT: 26.9 %
MCH RBC QN AUTO: 29.3 PG (ref 26–34)
MCHC RBC AUTO-ENTMCNC: 33.3 G/DL (ref 31–36)
MCV RBC AUTO: 88 FL (ref 80–100)
MONOCYTES ABSOLUTE: 0.7 K/UL (ref 0–1.3)
MONOCYTES RELATIVE PERCENT: 6.5 %
NEUTROPHILS ABSOLUTE: 6.6 K/UL (ref 1.7–7.7)
NEUTROPHILS RELATIVE PERCENT: 63.9 %
PDW BLD-RTO: 15.4 % (ref 12.4–15.4)
PLATELET # BLD: 302 K/UL (ref 135–450)
PMV BLD AUTO: 8.5 FL (ref 5–10.5)
RBC # BLD: 5.02 M/UL (ref 4.2–5.9)
WBC # BLD: 10.3 K/UL (ref 4–11)

## 2023-03-15 ENCOUNTER — OFFICE VISIT (OUTPATIENT)
Dept: PRIMARY CARE CLINIC | Age: 63
End: 2023-03-15
Payer: MEDICAID

## 2023-03-15 VITALS
OXYGEN SATURATION: 99 % | HEART RATE: 62 BPM | DIASTOLIC BLOOD PRESSURE: 90 MMHG | TEMPERATURE: 97.7 F | WEIGHT: 172.6 LBS | HEIGHT: 67 IN | SYSTOLIC BLOOD PRESSURE: 148 MMHG | BODY MASS INDEX: 27.09 KG/M2

## 2023-03-15 DIAGNOSIS — Z72.0 TOBACCO USE: ICD-10-CM

## 2023-03-15 DIAGNOSIS — Z13.220 LIPID SCREENING: ICD-10-CM

## 2023-03-15 DIAGNOSIS — E55.9 VITAMIN D DEFICIENCY: ICD-10-CM

## 2023-03-15 DIAGNOSIS — Z13.1 DIABETES MELLITUS SCREENING: ICD-10-CM

## 2023-03-15 DIAGNOSIS — Z00.00 ROUTINE PHYSICAL EXAMINATION: Primary | ICD-10-CM

## 2023-03-15 DIAGNOSIS — Z12.5 PROSTATE CANCER SCREENING: ICD-10-CM

## 2023-03-15 PROCEDURE — G8484 FLU IMMUNIZE NO ADMIN: HCPCS | Performed by: FAMILY MEDICINE

## 2023-03-15 PROCEDURE — 99396 PREV VISIT EST AGE 40-64: CPT | Performed by: FAMILY MEDICINE

## 2023-03-15 SDOH — ECONOMIC STABILITY: INCOME INSECURITY: HOW HARD IS IT FOR YOU TO PAY FOR THE VERY BASICS LIKE FOOD, HOUSING, MEDICAL CARE, AND HEATING?: NOT HARD AT ALL

## 2023-03-15 SDOH — ECONOMIC STABILITY: FOOD INSECURITY: WITHIN THE PAST 12 MONTHS, YOU WORRIED THAT YOUR FOOD WOULD RUN OUT BEFORE YOU GOT MONEY TO BUY MORE.: NEVER TRUE

## 2023-03-15 SDOH — ECONOMIC STABILITY: FOOD INSECURITY: WITHIN THE PAST 12 MONTHS, THE FOOD YOU BOUGHT JUST DIDN'T LAST AND YOU DIDN'T HAVE MONEY TO GET MORE.: NEVER TRUE

## 2023-03-15 ASSESSMENT — PATIENT HEALTH QUESTIONNAIRE - PHQ9
SUM OF ALL RESPONSES TO PHQ QUESTIONS 1-9: 0
SUM OF ALL RESPONSES TO PHQ QUESTIONS 1-9: 0
2. FEELING DOWN, DEPRESSED OR HOPELESS: 0
SUM OF ALL RESPONSES TO PHQ9 QUESTIONS 1 & 2: 0
SUM OF ALL RESPONSES TO PHQ QUESTIONS 1-9: 0
SUM OF ALL RESPONSES TO PHQ QUESTIONS 1-9: 0
1. LITTLE INTEREST OR PLEASURE IN DOING THINGS: 0

## 2023-03-15 ASSESSMENT — ENCOUNTER SYMPTOMS
ALLERGIC/IMMUNOLOGIC NEGATIVE: 1
BACK PAIN: 1
ABDOMINAL PAIN: 0
EYES NEGATIVE: 1

## 2023-03-15 NOTE — PROGRESS NOTES
SUBJECTIVE:  Patient ID: Erika Yarbrough is a 66 y.o. male. Chief Complaint:  Chief Complaint   Patient presents with    Annual Exam           HPI  66year old Male  Annual  Donate Blood Q 3-4 Month   Initial BP always high  Second BP reading get better  Blood type AB negative      Past Medical History:   Diagnosis Date    Gall stone     Incidental finding on CT chest     Tobacco use     White coat syndrome with high blood pressure but without hypertension      History reviewed. No pertinent surgical history. No Known Allergies    Family History   Problem Relation Age of Onset    Diabetes Mother          age 74+    Other Father         age 80year old @  [facility]    Hypertension Father         Live in [facility]    Other Sister         Live @[place]    Other Sister         Live @[place]    Other Brother         [address] [facility]          Social History     Social History Narrative     x 32 year    Children 3 boys & 1 Girl    Son got  Ft Job  + expecting baby    Son will graduate Medical school +Surgery residency    Son +GE    + tobacco    Daughter 12     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 No current facility-administered medications for this visit.      Lab Results   Component Value Date    WBC 10.3 2022    HGB 14.7 2022    HCT 44.1 2022    MCV 88.0 2022     2022     Lab Results   Component Value Date    CHOL 160 2022    CHOL 169 2021    CHOL 152 2020     Lab Results   Component Value Date    TRIG 78 2022    TRIG 81 2021 TRIG 80 03/11/2020     Lab Results   Component Value Date    HDL 38 (L) 04/06/2022    HDL 38 (L) 03/08/2021    HDL 39 (L) 03/11/2020     Lab Results   Component Value Date    LDLCALC 106 (H) 04/06/2022    LDLCALC 115 (H) 03/08/2021    LDLCALC 97 03/11/2020     Lab Results   Component Value Date    LABVLDL 16 04/06/2022    LABVLDL 16 03/08/2021    LABVLDL 16 03/11/2020     No results found for: CHOLHDLRATIO    Chemistry        Component Value Date/Time     04/06/2022 1141    K 4.9 04/06/2022 1141     04/06/2022 1141    CO2 19 (L) 04/06/2022 1141    BUN 14 04/06/2022 1141    CREATININE 1.0 04/06/2022 1141        Component Value Date/Time    CALCIUM 9.9 04/06/2022 1141    ALKPHOS 57 04/06/2022 1141    AST 19 04/06/2022 1141    ALT 28 04/06/2022 1141    BILITOT 0.8 04/06/2022 1141            Review of Systems   Constitutional: Negative.         Aware of BP reading  He check BP @Home   HENT: Negative.     Eyes: Negative.    Respiratory:          Snoring   He feels refreshed  +tobacco   Cardiovascular: Negative.    Gastrointestinal:  Negative for abdominal pain.        Gall stones  Incidental finding on CT chest  [place] guard July 2020    Endocrine: Negative.    Genitourinary:  Negative for dysuria and flank pain.        Wake up once   Musculoskeletal:  Positive for back pain.        Back pain when sit long time when get up some pain  Or if he lift heavy stuff  Walking is great    Skin: Negative.    Allergic/Immunologic: Negative.    Neurological: Negative.  Negative for dizziness and headaches.   Hematological: Negative.    Psychiatric/Behavioral: Negative.       OBJECTIVE:  BP (!) 148/90 (Site: Left Upper Arm, Position: Sitting, Cuff Size: Medium Adult)   Pulse 62   Temp 97.7 °F (36.5 °C) (Temporal)   Ht 5' 6.93\" (1.7 m)   Wt 172 lb 9.6 oz (78.3 kg)   SpO2 99%   BMI 27.09 kg/m²   Physical Exam  HENT:      Head: Normocephalic and atraumatic.      Right Ear: Tympanic membrane normal.      Left Ear:  Tympanic membrane normal.   Eyes:      Extraocular Movements: Extraocular movements intact. Pupils: Pupils are equal, round, and reactive to light. Cardiovascular:      Rate and Rhythm: Normal rate and regular rhythm. Pulses: Normal pulses. Heart sounds: Normal heart sounds. Pulmonary:      Effort: Pulmonary effort is normal.      Breath sounds: Normal breath sounds. No wheezing or rhonchi. Musculoskeletal:      Cervical back: Normal range of motion and neck supple. Right lower leg: No edema. Left lower leg: No edema. Skin:     Findings: No erythema or rash. Neurological:      General: No focal deficit present. Mental Status: He is alert and oriented to person, place, and time. Psychiatric:         Mood and Affect: Mood normal.         Behavior: Behavior normal.         Thought Content: Thought content normal.         Judgment: Judgment normal.       ASSESSMENT/PLAN:      Diagnosis Orders   1. Routine physical examination  CBC with Auto Differential    Comprehensive Metabolic Panel    TSH      2. Diabetes mellitus screening  Hemoglobin A1C      3. Lipid screening  Lipid Panel      4. Prostate cancer screening  PSA Screening      5. Vitamin D deficiency  Vitamin D 25 Hydroxy      6.  Tobacco use          [place] Guard done 7/2020  Lab as above  Tobacco cessation

## 2023-04-27 DIAGNOSIS — Z12.5 PROSTATE CANCER SCREENING: ICD-10-CM

## 2023-04-27 DIAGNOSIS — Z13.1 DIABETES MELLITUS SCREENING: ICD-10-CM

## 2023-04-27 DIAGNOSIS — E55.9 VITAMIN D DEFICIENCY: ICD-10-CM

## 2023-04-27 DIAGNOSIS — Z13.220 LIPID SCREENING: ICD-10-CM

## 2023-04-27 DIAGNOSIS — Z00.00 ROUTINE PHYSICAL EXAMINATION: ICD-10-CM

## 2023-04-27 LAB
BASOPHILS # BLD: 0.1 K/UL (ref 0–0.2)
BASOPHILS NFR BLD: 1.2 %
DEPRECATED RDW RBC AUTO: 15.2 % (ref 12.4–15.4)
EOSINOPHIL # BLD: 0.2 K/UL (ref 0–0.6)
EOSINOPHIL NFR BLD: 2.3 %
HCT VFR BLD AUTO: 45.8 % (ref 40.5–52.5)
HGB BLD-MCNC: 15.1 G/DL (ref 13.5–17.5)
LYMPHOCYTES # BLD: 3.1 K/UL (ref 1–5.1)
LYMPHOCYTES NFR BLD: 30.5 %
MCH RBC QN AUTO: 28.4 PG (ref 26–34)
MCHC RBC AUTO-ENTMCNC: 32.9 G/DL (ref 31–36)
MCV RBC AUTO: 86.5 FL (ref 80–100)
MONOCYTES # BLD: 0.7 K/UL (ref 0–1.3)
MONOCYTES NFR BLD: 6.7 %
NEUTROPHILS # BLD: 6.1 K/UL (ref 1.7–7.7)
NEUTROPHILS NFR BLD: 59.3 %
PLATELET # BLD AUTO: 306 K/UL (ref 135–450)
PMV BLD AUTO: 8.6 FL (ref 5–10.5)
RBC # BLD AUTO: 5.3 M/UL (ref 4.2–5.9)
WBC # BLD AUTO: 10.3 K/UL (ref 4–11)

## 2023-04-28 LAB
25(OH)D3 SERPL-MCNC: 26.4 NG/ML
ALBUMIN SERPL-MCNC: 4.4 G/DL (ref 3.4–5)
ALBUMIN/GLOB SERPL: 1.6 {RATIO} (ref 1.1–2.2)
ALP SERPL-CCNC: 64 U/L (ref 40–129)
ALT SERPL-CCNC: 23 U/L (ref 10–40)
ANION GAP SERPL CALCULATED.3IONS-SCNC: 11 MMOL/L (ref 3–16)
AST SERPL-CCNC: 16 U/L (ref 15–37)
BILIRUB SERPL-MCNC: 0.5 MG/DL (ref 0–1)
BUN SERPL-MCNC: 15 MG/DL (ref 7–20)
CALCIUM SERPL-MCNC: 9.5 MG/DL (ref 8.3–10.6)
CHLORIDE SERPL-SCNC: 104 MMOL/L (ref 99–110)
CHOLEST SERPL-MCNC: 138 MG/DL (ref 0–199)
CO2 SERPL-SCNC: 24 MMOL/L (ref 21–32)
CREAT SERPL-MCNC: 0.9 MG/DL (ref 0.8–1.3)
EST. AVERAGE GLUCOSE BLD GHB EST-MCNC: 114 MG/DL
GFR SERPLBLD CREATININE-BSD FMLA CKD-EPI: >60 ML/MIN/{1.73_M2}
GLUCOSE SERPL-MCNC: 103 MG/DL (ref 70–99)
HBA1C MFR BLD: 5.6 %
HDLC SERPL-MCNC: 45 MG/DL (ref 40–60)
LDLC SERPL CALC-MCNC: 76 MG/DL
POTASSIUM SERPL-SCNC: 4.9 MMOL/L (ref 3.5–5.1)
PROT SERPL-MCNC: 7.2 G/DL (ref 6.4–8.2)
PSA SERPL DL<=0.01 NG/ML-MCNC: 1.51 NG/ML (ref 0–4)
SODIUM SERPL-SCNC: 139 MMOL/L (ref 136–145)
TRIGL SERPL-MCNC: 83 MG/DL (ref 0–150)
TSH SERPL DL<=0.005 MIU/L-ACNC: 2.1 UIU/ML (ref 0.27–4.2)
VLDLC SERPL CALC-MCNC: 17 MG/DL

## 2023-09-15 ENCOUNTER — TELEPHONE (OUTPATIENT)
Dept: PRIMARY CARE CLINIC | Age: 63
End: 2023-09-15

## 2023-09-15 DIAGNOSIS — Z12.11 COLON CANCER SCREENING: Primary | ICD-10-CM

## 2023-09-15 NOTE — TELEPHONE ENCOUNTER
Patient is due for cologuard ,order is pended ,please let us know if appropriate ,please send back to pool .

## 2023-09-27 LAB — NONINV COLON CA DNA+OCC BLD SCRN STL QL: NEGATIVE
